# Patient Record
Sex: FEMALE
[De-identification: names, ages, dates, MRNs, and addresses within clinical notes are randomized per-mention and may not be internally consistent; named-entity substitution may affect disease eponyms.]

---

## 2022-10-14 ENCOUNTER — APPOINTMENT (OUTPATIENT)
Dept: NEUROSURGERY | Facility: CLINIC | Age: 87
End: 2022-10-14

## 2022-10-14 VITALS — WEIGHT: 158 LBS | BODY MASS INDEX: 29.83 KG/M2 | HEIGHT: 61.02 IN

## 2022-10-14 PROBLEM — Z00.00 ENCOUNTER FOR PREVENTIVE HEALTH EXAMINATION: Status: ACTIVE | Noted: 2022-10-14

## 2022-10-14 PROCEDURE — 99204 OFFICE O/P NEW MOD 45 MIN: CPT

## 2022-10-14 NOTE — HISTORY OF PRESENT ILLNESS
[de-identified] : 86 year old female here for evaluation. Family at bedside here with history. Reports that the last year or so, patient endorse difficulty ambulating, diffcullty with memory, personality changes and urinary incontinence. She reports that the last few months or so she is progressively declining. A month ago, family reports she is able to walk but with a very shuffling gait. Endorse now she is unable to walk to the point she needs to be in a wheelchair.\par \par In terms of urinary changes, she endorse sensation and able to make her ends need. She uses a diaper but does not like it and thus chooses not to use the diaper.\par \par In terms of her memory/speech, she is unable to recall her name and family members. Family reports that this has been persistent for the last 6 months.\par \par She has yet to go to a neurologist.

## 2022-10-14 NOTE — ASSESSMENT
[FreeTextEntry1] : 86 year old female here w/ family for evaluation for her cognitive decline. CTH ordered by PCP available for review - read cortical atrophy or hydrocephalus, concern for NPH. Imaging results and treatment options discussed with patient and family at length. We discuss given her cognitive decline, difficulty ambulating and changes in incontinence; these symptoms can be contributive to NPH. We recommend referral to neurology for a large volume LP w/ pre and post gait analysis to assess if there is any improvement. If there is improvement, she may be indicated for a VPS for NPH. We discuss there is no guarantee in improvement in her symptoms but our clinical suspicion is high. We will give referral for large volume LP for gait analysis. Follow up upon completion of large volume LP w/ pre and post gait analysis. We also recommend referral for neurologist for generalized cognitive decline. We also discuss to convene w/ family and patient's regarding her wishes in terms of advance directive given her age. Patient reports they will discuss amongst themselves and will follow up if they wouldlike to proceed.